# Patient Record
Sex: MALE | Race: WHITE | Employment: STUDENT | URBAN - METROPOLITAN AREA
[De-identification: names, ages, dates, MRNs, and addresses within clinical notes are randomized per-mention and may not be internally consistent; named-entity substitution may affect disease eponyms.]

---
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
## 2021-12-29 ENCOUNTER — APPOINTMENT (OUTPATIENT)
Dept: CT IMAGING | Facility: CLINIC | Age: 21
End: 2021-12-29
Attending: EMERGENCY MEDICINE
Payer: COMMERCIAL

## 2021-12-29 ENCOUNTER — APPOINTMENT (OUTPATIENT)
Dept: MRI IMAGING | Facility: CLINIC | Age: 21
End: 2021-12-29
Attending: EMERGENCY MEDICINE
Payer: COMMERCIAL

## 2021-12-29 ENCOUNTER — HOSPITAL ENCOUNTER (EMERGENCY)
Facility: CLINIC | Age: 21
Discharge: HOME | End: 2021-12-29
Attending: EMERGENCY MEDICINE | Admitting: EMERGENCY MEDICINE
Payer: COMMERCIAL

## 2021-12-29 VITALS
SYSTOLIC BLOOD PRESSURE: 120 MMHG | HEIGHT: 71 IN | DIASTOLIC BLOOD PRESSURE: 83 MMHG | HEART RATE: 82 BPM | BODY MASS INDEX: 19.6 KG/M2 | TEMPERATURE: 98.3 F | WEIGHT: 140 LBS | RESPIRATION RATE: 18 BRPM | OXYGEN SATURATION: 98 %

## 2021-12-29 DIAGNOSIS — R04.0 EPISTAXIS: ICD-10-CM

## 2021-12-29 DIAGNOSIS — S00.93XA CONTUSION OF HEAD, UNSPECIFIED PART OF HEAD, INITIAL ENCOUNTER: ICD-10-CM

## 2021-12-29 DIAGNOSIS — S02.2XXA CLOSED FRACTURE OF NASAL BONE, INITIAL ENCOUNTER: ICD-10-CM

## 2021-12-29 DIAGNOSIS — F10.929 ALCOHOLIC INTOXICATION WITH COMPLICATION (H): ICD-10-CM

## 2021-12-29 PROCEDURE — 250N000013 HC RX MED GY IP 250 OP 250 PS 637: Performed by: EMERGENCY MEDICINE

## 2021-12-29 PROCEDURE — 99285 EMERGENCY DEPT VISIT HI MDM: CPT | Mod: 25

## 2021-12-29 PROCEDURE — 70450 CT HEAD/BRAIN W/O DYE: CPT

## 2021-12-29 PROCEDURE — 72125 CT NECK SPINE W/O DYE: CPT

## 2021-12-29 PROCEDURE — 72141 MRI NECK SPINE W/O DYE: CPT

## 2021-12-29 PROCEDURE — 70486 CT MAXILLOFACIAL W/O DYE: CPT

## 2021-12-29 RX ORDER — ACETAMINOPHEN 325 MG/1
975 TABLET ORAL ONCE
Status: COMPLETED | OUTPATIENT
Start: 2021-12-29 | End: 2021-12-29

## 2021-12-29 RX ADMIN — ACETAMINOPHEN 975 MG: 325 TABLET, FILM COATED ORAL at 03:57

## 2021-12-29 ASSESSMENT — ENCOUNTER SYMPTOMS
WOUND: 1
SHORTNESS OF BREATH: 0
NECK PAIN: 0
BACK PAIN: 0

## 2021-12-29 ASSESSMENT — MIFFLIN-ST. JEOR: SCORE: 1652.17
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
## 2021-12-29 NOTE — DISCHARGE INSTRUCTIONS
Discharge Instructions  Head Injury    You have been seen today for a head injury. Your evaluation included a history and physical examination. You may have had a CT (CAT) scan performed, though most head injuries do not require a scan. Based on this evaluation, your provider today does not feel that your head injury is serious.    Generally, every Emergency Department visit should have a follow-up clinic visit with either a primary or a specialty clinic/provider. Please follow-up as instructed by your emergency provider today.  Return to the Emergency Department if:  You are confused or you are not acting right.  Your headache gets worse or you start to have a really bad headache even with your recommended treatment plan.  You vomit (throw up) more than once.  You have a seizure.  You have trouble walking.  You have weakness or paralysis (cannot move) in an arm or a leg.  You have blood or fluid coming from your ears or nose.  You have new symptoms or anything that worries you.    Sleeping:  It is okay for you to sleep, but someone should wake you up if instructed by your provider, and someone should check on you at your usual time to wake up.     Activity:  Do not drive for at least 24 hours.  Do not drive if you have dizzy spells or trouble concentrating, or remembering things.  Do not return to any contact sports until cleared by your regular provider.     MORE INFORMATION:    Concussion:  A concussion is a minor head injury that may cause temporary problems with the way the brain works. Although concussions are important, they are generally not an emergency or a reason that a person needs to be hospitalized. Some concussion symptoms include confusion, amnesia (forgetful), nausea (sick to your stomach) and vomiting (throwing up), dizziness, fatigue, memory or concentration problems, irritability and sleep problems. For most people, concussions are mild and temporary but some will have more severe and persistent  "symptoms that require on-going care and treatment.  CT Scans: Your evaluation today may have included a CT scan (CAT scan) to look for things like bleeding or a skull fracture (broken bone).  CT scans involve radiation and too many CT scans can cause serious health problems like cancer, especially in children.  Because of this, your provider may not have ordered a CT scan today if they think you are at low risk for a serious or life threatening problem.    If you were given a prescription for medicine here today, be sure to read all of the information (including the package insert) that comes with your prescription.  This will include important information about the medicine, its side effects, and any warnings that you need to know about.  The pharmacist who fills the prescription can provide more information and answer questions you may have about the medicine.  If you have questions or concerns that the pharmacist cannot address, please call or return to the Emergency Department.     Remember that you can always come back to the Emergency Department if you are not able to see your regular provider in the amount of time listed above, if you get any new symptoms, or if there is anything that worries you.  Discharge Instructions  Epistaxis    Today you were seen for a nosebleed.   Nosebleeds (the medical term is \"epistaxis\") are very common. Almost every person has had at least one in their lifetime.  Although the amount of blood loss can appear dramatic, nosebleeds rarely cause serious problems.  The most common causes are dry air or nose picking, but they also are common in people who have allergies, high blood pressure, or are on blood thinners (such as Coumadin, Aspirin or Plavix). If you or your child gets a nosebleed, the important thing is to know how to take care of it. With the right self-care, most nosebleeds will stop on their own.    Generally, every Emergency Department visit should have a follow-up " clinic visit with either a primary or a specialty clinic/provider. Please follow-up as instructed by your emergency provider today.    Return to the Emergency Department if:  Your nose is bleeding a very large amount of blood and you are unable to stop it.  You get very pale, faint, or tired.  You cannot get the bleeding to stop after following these instructions.    Treatment:  Your provider may tell you to use a decongestant nose spray, like Afrin  (oxymetazoline), in both nostrils in the morning and at night for the next three days. Do not use this medicine for more than three days at a time.  If you do, it will cause nasal congestion.   Use a moisturizer. A small amount of Vaseline  to the inside of your nostrils for moisture before bed is one option. There are nasal sprays available over-the-counter for this purpose as well.  Using a humidifier in your bedroom or home will help as well when the air is dry.  For the next three days, do not blow your nose or put anything in your nose. You may sniffle, or dab the outside of your nose.  Do not bend with your head below your waist for the next three days. Do not lift anything so heavy that you have to strain.   If you received nasal packing, please do not remove the packing until seen by an Ear, Nose, and Throat (ENT) specialist.  If antibiotics have been given with the packing, please take as directed.    If your nose starts to bleed again:  Blow your nose to get rid of some of the clots that have formed inside your nostrils. This may increase the bleeding temporarily, but that is okay.  Spray decongestant nose spray (like Afrin ) into both nostrils to constrict the blood vessels.  Sit or stand while bending forward slightly at the waist. Do not lie down or tilt your head back. This may cause you to swallow blood and can lead to vomiting.   the soft part of BOTH nostrils at the bottom of your nose and squeeze your nose closed for at least 5 minutes (for  children) or 10 to 15 minutes (for adults). Use a clock to time yourself. Do not release the pressure every so often to check whether the bleeding has stopped. Many people hurt their chances of stopping the bleeding by releasing the pressure too soon or too often.    If you follow the steps outlined above, and your nose continues to bleed, repeat all the steps once more. Apply pressure for a total of at least 30 minutes. If you continue to bleed even then, seek medical attention.  If you were given a prescription for medicine here today, be sure to read all of the information (including the package insert) that comes with your prescription.  This will include important information about the medicine, its side effects, and any warnings that you need to know about.  The pharmacist who fills the prescription can provide more information and answer questions you may have about the medicine.  If you have questions or concerns that the pharmacist cannot address, please call or return to the Emergency Department.   Remember that you can always come back to the Emergency Department if you are not able to see your regular provider in the amount of time listed above, if you get any new symptoms, or if there is anything that worries you.  Discharge Instructions  Alcohol Intoxication    You have been seen today with alcohol intoxication. This means that you have enough alcohol in your system to impair your ability to mentally and physically function, perhaps to the extent that you were unable to care for yourself.    Generally, every Emergency Department visit should have a follow-up clinic visit with either a primary or a specialty clinic/provider. Please follow-up as instructed by your emergency provider today.    You may have come to the Emergency Department because of your intoxication, or for another reason, such as because of an injury. No matter what the case is, this visit is a  red flag  regarding alcohol use, and you  should consider whether your drinking pattern is a problem for you.     You may be at risk for alcohol-related problems if:    Men: you drink more than 14 drinks per week, or more than 4 drinks per occasion.    Women: you drink more than 7 drinks per week or more than 3 drinks per occasion.    You have black-outs.  You do things you regret while drinking.  You have legal problems because of drinking.  You have job problems because of drinking (you call in sick to work because of drinking).    CAGE Questions  Have you ever felt you should cut down on your drinking?  Have people annoyed you by criticizing your drinking?  Have you ever felt bad or guilty about your drinking?  Have you ever had a drink first thing in the morning to steady your nerves or get rid of a hangover (eye opener)?    If you answer yes to any of the CAGE questions, you may have a problem with alcohol.      Return to the Emergency Department if:  You become shaky or tremble when you try to stop drinking.   You have severe abdominal pain (belly pain).   You have a seizure or pass out.    You vomit (throw up) blood or have blood in your stool. This may be bright red or it may look like black coffee grounds.  You become lightheaded or faint.      For further help, contact:   Your caregiver.    Alcoholics Anonymous (AA).    [facility]: [phone]  [place] [facility] Central Office: [phone]   A drug or alcohol rehabilitation program.    You can get information on alcohol resources and groups by calling the number 211 or 1-777.661.1329 on any phone.     Seek medical care if:  You have persistent vomiting.   You have persistent pain in any part of your body.    You do not feel better after a few days.    If you were given a prescription for medicine here today, be sure to read all of the information (including the package insert) that comes with your prescription.  This will include important information about the  medicine, its side effects, and any warnings that you need to know about.  The pharmacist who fills the prescription can provide more information and answer questions you may have about the medicine.  If you have questions or concerns that the pharmacist cannot address, please call or return to the Emergency Department.   Remember that you can always come back to the Emergency Department if you are not able to see your regular doctor in the amount of time listed above, if you get any new symptoms, or if there is anything that worries you.